# Patient Record
Sex: FEMALE | Race: WHITE
[De-identification: names, ages, dates, MRNs, and addresses within clinical notes are randomized per-mention and may not be internally consistent; named-entity substitution may affect disease eponyms.]

---

## 2021-10-22 ENCOUNTER — HOSPITAL ENCOUNTER (OUTPATIENT)
Dept: HOSPITAL 79 - EXRD | Age: 46
End: 2021-10-22
Attending: INTERNAL MEDICINE
Payer: COMMERCIAL

## 2021-10-22 DIAGNOSIS — E04.9: Primary | ICD-10-CM

## 2023-12-04 ENCOUNTER — CONSULT (OUTPATIENT)
Dept: ONCOLOGY | Facility: CLINIC | Age: 48
End: 2023-12-04
Payer: COMMERCIAL

## 2023-12-04 VITALS
DIASTOLIC BLOOD PRESSURE: 76 MMHG | OXYGEN SATURATION: 99 % | WEIGHT: 148.2 LBS | HEIGHT: 63 IN | BODY MASS INDEX: 26.26 KG/M2 | TEMPERATURE: 97.8 F | SYSTOLIC BLOOD PRESSURE: 125 MMHG | RESPIRATION RATE: 16 BRPM | HEART RATE: 59 BPM

## 2023-12-04 DIAGNOSIS — R79.89 ELEVATED FERRITIN: Primary | ICD-10-CM

## 2023-12-04 PROCEDURE — 99204 OFFICE O/P NEW MOD 45 MIN: CPT | Performed by: NURSE PRACTITIONER

## 2023-12-04 RX ORDER — INDOMETHACIN 50 MG/1
CAPSULE ORAL
COMMUNITY
Start: 2022-11-27

## 2023-12-04 RX ORDER — TIZANIDINE 4 MG/1
TABLET ORAL
COMMUNITY

## 2023-12-04 RX ORDER — MAGNESIUM CARB/ALUMINUM HYDROX 105-160MG
TABLET,CHEWABLE ORAL ONCE
COMMUNITY

## 2023-12-04 RX ORDER — RIZATRIPTAN BENZOATE 10 MG/1
TABLET ORAL
COMMUNITY
Start: 2022-11-27

## 2023-12-04 RX ORDER — DESOGESTREL AND ETHINYL ESTRADIOL 21-5 (28)
KIT ORAL
COMMUNITY
Start: 2003-08-27

## 2023-12-04 NOTE — PROGRESS NOTES
DATE OF CONSULTATION:  12/4/2023    REASON FOR REFERRAL: Elevated Ferritin    REFERRING PHYSICIAN:  Charlene Jacobsen MD    CHIEF COMPLAINT:  Chronic aches/pains        HISTORY OF PRESENT ILLNESS:   Ruchi Reid is a very pleasant 48 y.o. female who is being seen today at the request of Charlene Jacobsen MD for evaluation and treatment of elevated ferritin. Ms. Reid follows with her PCP annually with lab testing. She has been aware of this issue for about one year. She previously had bariatric surgery in 2016 and was started on MVI combined with iron supplement daily since that time. She denies any family history or personal history of iron overload disorder. Denies drinking alcohol. She denies eating iron rich foods in excess. She reports chronic aches/pains and has a diagnosis of degenerative arthritis of cervical spine. Previous available labs reviewed and she had mildly elevated ferritin level 337 ng/mL with normal Iron panel in October 2023. Ferritin level obtained in October 2022 was ~311 ng/mL. There are no other labs available to review for comparison of trend. Patient also reports that she takes oral contraceptives and no longer has menstrual cycles. She denies any other specific complaints at this time.     PAST MEDICAL HISTORY:  Past Medical History:   Diagnosis Date    Arthritis     Melanoma     Migraine        PAST SURGICAL HISTORY:  Past Surgical History:   Procedure Laterality Date    GASTRIC BYPASS  2016       FAMILY HISTORY:  Family History   Problem Relation Age of Onset    Cancer Mother        SOCIAL HISTORY:  Social History     Socioeconomic History    Marital status:    Tobacco Use    Smoking status: Never     Passive exposure: Never    Smokeless tobacco: Never   Vaping Use    Vaping Use: Never used   Substance and Sexual Activity    Alcohol use: Never    Drug use: Never    Sexual activity: Defer            MEDICATIONS:  The current medication list was reviewed in the  "EMR    Current Outpatient Medications:     Calcium Carbonate-Vit D-Min (CALCIUM 1200 PO), Take  by mouth., Disp: , Rfl:     Cyanocobalamin (B-12 PO), Take 1,500 mcg by mouth., Disp: , Rfl:     Ferrous Sulfate (IRON PO), Take 45 mg by mouth., Disp: , Rfl:     indomethacin (INDOCIN) 50 MG capsule, , Disp: , Rfl:     magnesium citrate 1.745 GM/30ML solution solution, Take  by mouth 1 (One) Time., Disp: , Rfl:     rizatriptan (MAXALT) 10 MG tablet, , Disp: , Rfl:     tiZANidine (ZANAFLEX) 4 MG tablet, , Disp: , Rfl:     Volnea 0.15-0.02/0.01 MG (21/5) per tablet, , Disp: , Rfl:     ALLERGIES:  No Known Allergies      REVIEW OF SYSTEMS:    A comprehensive 14 point review of systems was performed.  Significant findings as mentioned above.  All other systems reviewed and are negative.        Physical Exam   Vital Signs: /76   Pulse 59   Temp 97.8 °F (36.6 °C) (Temporal)   Resp 16   Ht 160 cm (63\")   Wt 67.2 kg (148 lb 3.2 oz)   SpO2 99%   BMI 26.25 kg/m²  BMI cannot be calculated due to outdated height or weight values.  Please input a current height/weight in Vitals and re-renter BMIFOLLOWUP in Note to pull in correct documentation based on BMI range.    ECOG score: 0   General: Well developed, well nourished, alert and oriented x 3, in no acute distress.   Head: ATNC   Eyes: PERRL, No evidence of conjunctivitis.   Nose: No nasal discharge.   Mouth: Oral mucosal membranes moist. No oral ulceration or hemorrhages.   Neck: Neck supple. No thyromegaly. No JVD.   Lungs: Clear in all fields to A&P without rales, rhonchi or wheezing.   Heart: S1, S2. Regular rate and rhythm. No murmurs, rubs, or gallops.   Abdomen: Soft. Bowel sounds are normoactive. Nontender with palpation. No Hepatosplenomegaly can be appreciated.   Extremities: No cyanosis or edema. Peripheral pulses palpable and equal bilaterally.   Integumentary: No rash, sores, erythema or nodules. No blistering, bruising, or dry skin.   Hem/Lymph Nodes: No " palpable cervical, submandibular, supraclavicular, axillary  lymphadenopathy noted. No petechiae, purpura or ecchymosis noted.   Neurologic: Grossly non-focal exam    Pain Score:  Pain Score    12/04/23 1242   PainSc: 0-No pain       PHQ-Score Total:  PHQ-9 Total Score: 0       PATHOLOGY:        ENDOSCOPY:        IMAGING:        RECENT LABS:              ASSESSMENT & PLAN:  Ruchi Reid is a very pleasant 48 y.o. female with    1. Elevated Ferritin  - Previous available labs reviewed and she had mildly elevated ferritin level 337 ng/mL with normal Iron panel in October 2023. Ferritin level obtained in October 2022 was ~311 ng/mL. There are no other labs available to review for comparison of trend.   - She previously had bariatric surgery in 2016 and was started on MVI combined with iron supplement daily since that time.   - She denies any family history or personal history of iron overload disorder. Denies drinking alcohol. She denies eating iron rich foods in excess. She reports chronic aches/pains and has a diagnosis of degenerative arthritis of cervical spine.  - Patient also reports that she takes oral contraceptives and no longer has menstrual cycles.  - Discussed with patient that several factors could be contributing to elevated ferritin including daily intake of MVI with Iron versus chronic inflammation versus no menstrual cycles. Recommended that patient stop taking MVI with Iron before obtaining Hemochromatosis Mutation panel and she is agreeable.   - Patient will discontinue MVI with oral Iron today. She will repeat labs as planned with PCP in April 2024. She will follow up in clinic in 6 months with CBC, CMP, Iron Panel, Ferritin, CRP and ESR. If Ferritin level remains elevated will plan to obtain Hemochromatosis Mutation Panel at that time.      ACO / MAY/Other  Quality measures  -  Ruchi Reid received 2023 flu vaccine.  -  Ruchi Reid reports a pain score of 0.   -  Current outpatient and discharge  medications have been reconciled for the patient.  Reviewed by: ELIZA Mckeon            The patient was in agreement with the plan and all questions were answered to her satisfaction.     Thank you so much for allowing us to participate in the care of Ruchi Reid . Please do not hesitate to contact us with any questions or concerns.     I spent 45 minutes caring for Ruchi on this date of service. This time includes time spent by me in the following activities: preparing for the visit, reviewing tests, obtaining and/or reviewing a separately obtained history, performing a medically appropriate examination and/or evaluation, counseling and educating the patient/family/caregiver, ordering medications, tests, or procedures, documenting information in the medical record, independently interpreting results and communicating that information with the patient/family/caregiver, and care coordination.           Electronically Signed by: ELIZA Briscoe , December 4, 2023 13:54 EST           Electronically Signed by: ELIZA Briscoe , December 4, 2023 13:54 EST       CC:   MD Delmar Chase Gina Leanne, MD

## 2023-12-11 ENCOUNTER — PATIENT ROUNDING (BHMG ONLY) (OUTPATIENT)
Dept: ONCOLOGY | Facility: CLINIC | Age: 48
End: 2023-12-11
Payer: COMMERCIAL

## 2024-05-24 ENCOUNTER — TELEPHONE (OUTPATIENT)
Dept: ONCOLOGY | Facility: CLINIC | Age: 49
End: 2024-05-24
Payer: COMMERCIAL

## 2024-05-24 NOTE — TELEPHONE ENCOUNTER
Caller: Ruchi Reid    Relationship: Self    Best call back number: [phone]     What is the best time to reach you: ANYTIME    Who are you requesting to speak with (clinical staff, provider,  specific staff member): SCHEDULING        What was the call regarding: PATIENT NEEDS TO CANCEL THE 6/18 APPT WITH US, SHE SAID [facility] ADVISED HER IF HER LABS WERE GOOD AT PCP FOLLOW UP THEN THAT APPT WILL NOT BE NEEDED.  LABS WERE GOOD AND PATIENT SAYS A COPY CAN BE SENT TO [facility] IF NEEDED, DONE THROUGH [employer], JUST LET HER KNOW IF THIS NEEDS TO BE SENT.  PLEASE CANCEL APPT.

## 2024-09-23 ENCOUNTER — OFFICE VISIT (OUTPATIENT)
Dept: ENDOCRINOLOGY | Facility: CLINIC | Age: 49
End: 2024-09-23
Payer: COMMERCIAL

## 2024-09-23 VITALS
HEIGHT: 63 IN | SYSTOLIC BLOOD PRESSURE: 120 MMHG | BODY MASS INDEX: 28.17 KG/M2 | WEIGHT: 159 LBS | OXYGEN SATURATION: 99 % | HEART RATE: 81 BPM | DIASTOLIC BLOOD PRESSURE: 74 MMHG

## 2024-09-23 DIAGNOSIS — E04.1 SOLITARY THYROID NODULE: Primary | ICD-10-CM

## 2024-09-23 PROCEDURE — 99203 OFFICE O/P NEW LOW 30 MIN: CPT | Performed by: INTERNAL MEDICINE
